# Patient Record
Sex: FEMALE | Race: AMERICAN INDIAN OR ALASKA NATIVE
[De-identification: names, ages, dates, MRNs, and addresses within clinical notes are randomized per-mention and may not be internally consistent; named-entity substitution may affect disease eponyms.]

---

## 2022-05-19 ENCOUNTER — HOSPITAL ENCOUNTER (EMERGENCY)
Dept: HOSPITAL 5 - ED | Age: 27
Discharge: HOME | End: 2022-05-19
Payer: SELF-PAY

## 2022-05-19 VITALS — DIASTOLIC BLOOD PRESSURE: 78 MMHG | SYSTOLIC BLOOD PRESSURE: 124 MMHG

## 2022-05-19 DIAGNOSIS — Z79.899: ICD-10-CM

## 2022-05-19 DIAGNOSIS — B37.3: Primary | ICD-10-CM

## 2022-05-19 LAB
BILIRUB UR QL STRIP: (no result)
BLOOD UR QL VISUAL: (no result)
MUCOUS THREADS #/AREA URNS HPF: (no result) /HPF
PH UR STRIP: 6 [PH] (ref 5–7)
PROT UR STRIP-MCNC: (no result) MG/DL
RBC #/AREA URNS HPF: 6 /HPF (ref 0–6)
UROBILINOGEN UR-MCNC: < 2 MG/DL (ref ?–2)
WBC #/AREA URNS HPF: 2 /HPF (ref 0–6)

## 2022-05-19 PROCEDURE — 81025 URINE PREGNANCY TEST: CPT

## 2022-05-19 PROCEDURE — 81001 URINALYSIS AUTO W/SCOPE: CPT

## 2022-05-19 PROCEDURE — 99283 EMERGENCY DEPT VISIT LOW MDM: CPT

## 2022-05-19 NOTE — EMERGENCY DEPARTMENT REPORT
ED Female  HPI





- General


Chief complaint: Urogenital-Female


Stated complaint: VAGINAL DISCOMFORT


Time Seen by Provider: 05/19/22 17:48


Source: patient


Mode of arrival: Ambulatory


Limitations: No Limitations





- History of Present Illness


Initial comments: 





27-year-old black female with no past medical history presents to the emergency 

department for evaluation of vaginal discomfort since yesterday.  She states 

that she has not had any vaginal discharge, vaginal bleeding, dysuria, abdominal

pain, or fever.  She states that she just has redness and discomfort in her 

vulvar area and it is even gives her some discomfort for her panties to be 

against her vaginal area at this time.  She denies any medications at home.


MD Complaint: other (Vaginal discomfort)


-: Gradual, days(s) (1)


Location: other


Radiation: non-radiating (Vulvar area)


Severity: mild


Severity scale (0 -10): 2


Quality: burning


Consistency: intermittent


Worsens with: other (When something is touching it)


Are you Pregnant Now?: No





- Related Data


                                  Previous Rx's











 Medication  Instructions  Recorded  Last Taken  Type


 


Fluconazole 150 mg PO ONCE #1 tab 05/19/22 Unknown Rx











                                    Allergies











Allergy/AdvReac Type Severity Reaction Status Date / Time


 


No Known Allergies Allergy   Verified 05/19/22 14:01














ED Review of Systems


ROS: 


Stated complaint: VAGINAL DISCOMFORT


Other details as noted in HPI





Constitutional: denies: chills, fever


Respiratory: denies: shortness of breath


Cardiovascular: denies: chest pain, palpitations


Gastrointestinal: denies: abdominal pain, nausea, vomiting, diarrhea, 

hematemesis, melena, hematochezia


Genitourinary: denies: urgency, dysuria, frequency, hematuria, discharge, 

abnormal menses


Musculoskeletal: denies: back pain


Skin: denies: rash, lesions


Neurological: denies: headache, weakness





ED Past Medical Hx





- Past Medical History


Previous Medical History?: No





- Surgical History


Past Surgical History?: No





- Social History


Smoking Status: Never Smoker


Substance Use Type: None





- Medications


Home Medications: 


                                Home Medications











 Medication  Instructions  Recorded  Confirmed  Last Taken  Type


 


Fluconazole 150 mg PO ONCE #1 tab 05/19/22  Unknown Rx














ED Physical Exam





- General


Limitations: No Limitations


General appearance: alert, in no apparent distress





- Head


Head exam: Present: atraumatic, normocephalic





- Eye


Eye exam: Present: normal appearance.  Absent: conjunctival injection





- Neck


Neck exam: Present: normal inspection, full ROM.  Absent: lymphadenopathy





- Respiratory


Respiratory exam: Present: normal lung sounds bilaterally.  Absent: respiratory 

distress, wheezes, rales, rhonchi, stridor, chest wall tenderness





- Cardiovascular


Cardiovascular Exam: Present: regular rate, normal heart sounds





- GI/Abdominal


GI/Abdominal exam: Present: soft, normal bowel sounds.  Absent: distended, 

tenderness, guarding, rebound, rigid





- 


External exam: Present: erythema, swelling, other (Trace thick white discharge 

noted)





- Extremities Exam


Extremities exam: Present: normal inspection, normal capillary refill.  Absent: 

pedal edema, joint swelling, calf tenderness





- Back Exam


Back exam: Present: normal inspection.  Absent: CVA tenderness (R), CVA 

tenderness (L), vertebral tenderness





- Neurological Exam


Neurological exam: Present: alert, oriented X3, normal gait





- Psychiatric


Psychiatric exam: Present: normal affect, normal mood





- Skin


Skin exam: Present: warm, dry, intact, normal color





ED Course


                                   Vital Signs











  05/19/22 05/19/22





  14:02 17:53


 


Temperature 98.7 F 98.2 F


 


Pulse Rate 99 H 83


 


Respiratory 16 20





Rate  


 


Blood Pressure 123/53 


 


Blood Pressure  119/75





[Right]  


 


O2 Sat by Pulse 98 99





Oximetry  














ED Medical Decision Making





- Medical Decision Making





27-year-old black female with no past medical history presents to the emergency 

department for evaluation of vaginal discomfort since yesterday.  She states 

that she has not had any vaginal discharge, vaginal bleeding, dysuria, abdominal

 pain, or fever.  She states that she just has redness and discomfort in her 

vulvar area and it is even gives her some discomfort for her panties to be 

against her vaginal area at this time.  She denies any medications at home.





Symptoms and exam consistent with yeast vaginitis.  Patient will be treated with

 a prescription for a one-time dose of Diflucan 150 mg.  She is advised to take 

medications as prescribed and follow-up with OB/GYN or primary care provider if 

no improvement or worsening symptoms.  She verbalized understanding of and 

agreement with plan of care.


Critical care attestation.: 


If time is entered above; I have spent that time in minutes in the direct care 

of this critically ill patient, excluding procedure time.








ED Disposition


Clinical Impression: 


 Yeast vaginitis





Disposition: 01 HOME / SELF CARE / HOMELESS


Is pt being admited?: No


Does the pt Need Aspirin: No


Condition: Stable


Instructions:  Vaginitis, Easy-to-Read, Vaginal Yeast Infection, Adult, 

Probiotics


Additional Instructions: 


Take medication as prescribed.  Follow-up with primary care provider or OB/GYN 

if no improvement or worsening symptoms.  Return to the emergency department as 

needed.


Prescriptions: 


Fluconazole 150 mg PO ONCE #1 tab


Referrals: 


RK BURGER MD [Staff Physician] - 3-5 Days


Forms:  Work/School Release Form(ED)


Time of Disposition: 18:22